# Patient Record
Sex: FEMALE | Race: WHITE | NOT HISPANIC OR LATINO | URBAN - METROPOLITAN AREA
[De-identification: names, ages, dates, MRNs, and addresses within clinical notes are randomized per-mention and may not be internally consistent; named-entity substitution may affect disease eponyms.]

---

## 2019-01-02 ENCOUNTER — OUTPATIENT (OUTPATIENT)
Dept: OUTPATIENT SERVICES | Facility: HOSPITAL | Age: 79
LOS: 1 days | End: 2019-01-02
Payer: MEDICARE

## 2019-01-02 VITALS
SYSTOLIC BLOOD PRESSURE: 147 MMHG | RESPIRATION RATE: 18 BRPM | OXYGEN SATURATION: 97 % | HEART RATE: 78 BPM | HEIGHT: 59 IN | DIASTOLIC BLOOD PRESSURE: 66 MMHG | TEMPERATURE: 98 F | WEIGHT: 169.98 LBS

## 2019-01-02 DIAGNOSIS — H25.9 UNSPECIFIED AGE-RELATED CATARACT: Chronic | ICD-10-CM

## 2019-01-02 DIAGNOSIS — Z90.710 ACQUIRED ABSENCE OF BOTH CERVIX AND UTERUS: Chronic | ICD-10-CM

## 2019-01-02 DIAGNOSIS — R94.39 ABNORMAL RESULT OF OTHER CARDIOVASCULAR FUNCTION STUDY: ICD-10-CM

## 2019-01-02 LAB
ANION GAP SERPL CALC-SCNC: 10 MMOL/L — SIGNIFICANT CHANGE UP (ref 5–17)
BUN SERPL-MCNC: 14 MG/DL — SIGNIFICANT CHANGE UP (ref 7–23)
CALCIUM SERPL-MCNC: 9.8 MG/DL — SIGNIFICANT CHANGE UP (ref 8.4–10.5)
CHLORIDE SERPL-SCNC: 102 MMOL/L — SIGNIFICANT CHANGE UP (ref 96–108)
CO2 SERPL-SCNC: 28 MMOL/L — SIGNIFICANT CHANGE UP (ref 22–31)
CREAT SERPL-MCNC: 0.84 MG/DL — SIGNIFICANT CHANGE UP (ref 0.5–1.3)
GLUCOSE SERPL-MCNC: 112 MG/DL — HIGH (ref 70–99)
HCT VFR BLD CALC: 41 % — SIGNIFICANT CHANGE UP (ref 34.5–45)
HGB BLD-MCNC: 13.7 G/DL — SIGNIFICANT CHANGE UP (ref 11.5–15.5)
MCHC RBC-ENTMCNC: 29.5 PG — SIGNIFICANT CHANGE UP (ref 27–34)
MCHC RBC-ENTMCNC: 33.4 GM/DL — SIGNIFICANT CHANGE UP (ref 32–36)
MCV RBC AUTO: 88.3 FL — SIGNIFICANT CHANGE UP (ref 80–100)
PLATELET # BLD AUTO: 219 K/UL — SIGNIFICANT CHANGE UP (ref 150–400)
POTASSIUM SERPL-MCNC: 4.3 MMOL/L — SIGNIFICANT CHANGE UP (ref 3.5–5.3)
POTASSIUM SERPL-SCNC: 4.3 MMOL/L — SIGNIFICANT CHANGE UP (ref 3.5–5.3)
RBC # BLD: 4.64 M/UL — SIGNIFICANT CHANGE UP (ref 3.8–5.2)
RBC # FLD: 13.1 % — SIGNIFICANT CHANGE UP (ref 10.3–14.5)
SODIUM SERPL-SCNC: 140 MMOL/L — SIGNIFICANT CHANGE UP (ref 135–145)
WBC # BLD: 6.2 K/UL — SIGNIFICANT CHANGE UP (ref 3.8–10.5)
WBC # FLD AUTO: 6.2 K/UL — SIGNIFICANT CHANGE UP (ref 3.8–10.5)

## 2019-01-02 PROCEDURE — C1894: CPT

## 2019-01-02 PROCEDURE — 99152 MOD SED SAME PHYS/QHP 5/>YRS: CPT

## 2019-01-02 PROCEDURE — 99203 OFFICE O/P NEW LOW 30 MIN: CPT

## 2019-01-02 PROCEDURE — C1769: CPT

## 2019-01-02 PROCEDURE — 93458 L HRT ARTERY/VENTRICLE ANGIO: CPT | Mod: 26,GC

## 2019-01-02 PROCEDURE — 76937 US GUIDE VASCULAR ACCESS: CPT | Mod: 26,GC

## 2019-01-02 PROCEDURE — 80048 BASIC METABOLIC PNL TOTAL CA: CPT

## 2019-01-02 PROCEDURE — 93010 ELECTROCARDIOGRAM REPORT: CPT

## 2019-01-02 PROCEDURE — 93458 L HRT ARTERY/VENTRICLE ANGIO: CPT

## 2019-01-02 PROCEDURE — C1760: CPT

## 2019-01-02 PROCEDURE — 93005 ELECTROCARDIOGRAM TRACING: CPT

## 2019-01-02 PROCEDURE — 99152 MOD SED SAME PHYS/QHP 5/>YRS: CPT | Mod: GC

## 2019-01-02 PROCEDURE — 76937 US GUIDE VASCULAR ACCESS: CPT

## 2019-01-02 PROCEDURE — 85027 COMPLETE CBC AUTOMATED: CPT

## 2019-01-02 PROCEDURE — C1887: CPT

## 2019-01-02 NOTE — H&P CARDIOLOGY - HISTORY OF PRESENT ILLNESS
This is a 79y/o female with PMHX of HTN, and Emphysema hx smoking . PT recently complained of sob , dyspnea and fatigue. She has mild dyspnea with exertion . PT denies any CP no SOB no palpitations or lightheadedness /dizziness.  Pt went to Cardiologist Dr. Van and had recent Stress test on 11/28/18 abnormal, recent Echo on 11/28/18 , Carotid doppler on 11/28/18 and Holter Monitor 11/28/18 , now  referred for cardiac cath . This is a 79y/o female with PMHX of HTN, and Emphysema hx smoking . PT recently complained of sob , dyspnea and fatigue. She has mild dyspnea with exertion . PT denies any CP no SOB no palpitations or lightheadedness /dizziness.  Pt went to Cardiologist Dr. Van and had recent Stress test on 11/28/18 abnormal, (as per patient no results noted ) , recent Echo on 11/28/18 , Carotid doppler on 11/28/18 and Holter Monitor 11/28/18 , now  referred for cardiac cath with Dr. Mijares . This is a 77y/o  female with PMHX of HTN, and Emphysema hx smoking . PT recently complained of sob , dyspnea and fatigue. She has mild dyspnea with exertion . PT denies any CP no SOB no palpitations or lightheadedness /dizziness.  Pt went to Cardiologist Dr. Van and had recent Stress test on 11/29/18 abnormal, findings concerning for inferior wall myocardial ischemia EF 58%  , recent Echo on 11/28/18 showed mild LVH, normal LV, mild MR, Trace TR EF 65% , Carotid doppler on 11/28/18 showed mild intimal thickening bilaterally . Pt  now  referred for cardiac cath with Dr. Mijares . Currently CP free no sob no palpitations noted.

## 2023-08-30 NOTE — H&P CARDIOLOGY - PSYCHIATRIC
[employer] Pregnancy And Lactation Text: This medication is Pregnancy Category X and contraindicated in pregnancy and in women who may become pregnant. It is unknown if this medication is excreted in breast milk. Affect and characteristics of appearance, verbalizations, behaviors are appropriate

## 2024-09-10 PROBLEM — Z00.00 ENCOUNTER FOR PREVENTIVE HEALTH EXAMINATION: Status: ACTIVE | Noted: 2024-09-10

## 2024-09-24 PROBLEM — Z78.9 DENIES ALCOHOL CONSUMPTION: Status: ACTIVE | Noted: 2024-09-24

## 2024-09-24 PROBLEM — K66.8 MASS OF OMENTUM: Status: ACTIVE | Noted: 2024-09-24

## 2024-09-24 PROBLEM — E78.5 HLD (HYPERLIPIDEMIA): Status: ACTIVE | Noted: 2024-09-24

## 2024-09-24 PROBLEM — C18.9 COLON CANCER: Status: ACTIVE | Noted: 2024-09-24

## 2024-09-24 PROBLEM — J43.9 EMPHYSEMA OF LUNG: Status: ACTIVE | Noted: 2024-09-24

## 2024-10-01 ENCOUNTER — APPOINTMENT (OUTPATIENT)
Dept: INTERVENTIONAL RADIOLOGY/VASCULAR | Facility: CLINIC | Age: 84
End: 2024-10-01
Payer: MEDICARE

## 2024-10-01 VITALS — WEIGHT: 132 LBS | HEIGHT: 60 IN | BODY MASS INDEX: 25.91 KG/M2

## 2024-10-01 DIAGNOSIS — K76.9 LIVER DISEASE, UNSPECIFIED: ICD-10-CM

## 2024-10-01 DIAGNOSIS — Z87.891 PERSONAL HISTORY OF NICOTINE DEPENDENCE: ICD-10-CM

## 2024-10-01 DIAGNOSIS — J43.9 EMPHYSEMA, UNSPECIFIED: ICD-10-CM

## 2024-10-01 DIAGNOSIS — Z78.9 OTHER SPECIFIED HEALTH STATUS: ICD-10-CM

## 2024-10-01 DIAGNOSIS — E78.5 HYPERLIPIDEMIA, UNSPECIFIED: ICD-10-CM

## 2024-10-01 DIAGNOSIS — K66.8 OTHER SPECIFIED DISORDERS OF PERITONEUM: ICD-10-CM

## 2024-10-01 DIAGNOSIS — C18.9 MALIGNANT NEOPLASM OF COLON, UNSPECIFIED: ICD-10-CM

## 2024-10-01 PROCEDURE — 99203 OFFICE O/P NEW LOW 30 MIN: CPT

## 2024-10-01 RX ORDER — FERROUS SULFATE 325(65) MG
TABLET ORAL
Refills: 0 | Status: ACTIVE | COMMUNITY

## 2024-10-01 RX ORDER — SERTRALINE HYDROCHLORIDE 25 MG/1
TABLET, FILM COATED ORAL
Refills: 0 | Status: ACTIVE | COMMUNITY

## 2024-10-01 RX ORDER — OXYCODONE 10 MG/1
10 TABLET ORAL
Refills: 0 | Status: ACTIVE | COMMUNITY

## 2024-10-01 NOTE — HISTORY OF PRESENT ILLNESS
[FreeTextEntry1] : Patient is a 84 year old female with a past medical history of newly assumed colon cancer, liver lesion and omentum lesion. She has been referred to IR by Dr. Garcias for consultation regarding a liver lesion biopsy.   Patient endorses having sudden weight loss and anemia which is what prompted evaluation.   Patient denies recent fever, chills, shortness of breath, chest pain, nausea, vomiting or diarrhea   **patient aware to have bloodwork done prior to procedure

## 2024-10-01 NOTE — ASSESSMENT
[FreeTextEntry1] : 84 year old female with a past medical history of newly assumed colon cancer, liver lesion and omentum lesion. She has been referred to IR by Dr. Garcias for consultation regarding a liver lesion biopsy.  The CT was reviewed with the patient and her family.  The omental nodule is identified however may not be easily accessible and may not have high yield as the liver biopsy.  While associated with higher risk of bleeding, liver mass biopsy should have higher diagnostic yield.  The full procedure of image guided liver mass biopsy was discussed with the patient and her family.  This included a discussion of the risks, benefits, and alternatives.  Risk discussed included, but were not limited to, risk of bleeding, infection, adjacent organ injury, and inadequate specimen.  The potential need for admission, transfusion, and additional procedures including angiogram and embolization were discussed.  Ample time was provided to answer their questions.  Consent was obtained at the time of consultation.  Plan: Liver mass biopsy with sedation. Procedure can be performed with ultrasound or CT guidance.

## 2024-10-01 NOTE — REVIEW OF SYSTEMS
[Constipation] : constipation [Fever] : no fever [Chills] : no chills [Nosebleeds] : no nosebleeds [Sore Throat] : no sore throat [Chest Pain] : no chest pain [Palpitations] : no palpitations [Shortness Of Breath] : no shortness of breath [Wheezing] : no wheezing [Cough] : no cough [Abdominal Pain] : no abdominal pain [Vomiting] : no vomiting [Easy Bleeding] : no tendency for easy bleeding [Easy Bruising] : no tendency for easy bruising

## 2024-10-01 NOTE — PHYSICAL EXAM
[Alert] : alert [Oriented x3] : oriented to person, place, and time [Restricted in physically strenuous activity but ambulatory and able to carry out work of a light or sedentary nature] : Restricted in physically strenuous activity but ambulatory and able to carry out work of a light or sedentary nature, e.g., light house work, office work [Well Nourished] : well nourished [No Respiratory Distress] : no respiratory distress [No Accessory Muscle Use] : no accessory muscle use [Normal Affect] : the affect was normal

## 2024-10-01 NOTE — REVIEW OF SYSTEMS
[Constipation] : constipation [Fever] : no fever [Chills] : no chills [Nosebleeds] : no nosebleeds [Sore Throat] : no sore throat [Chest Pain] : no chest pain [Palpitations] : no palpitations [Shortness Of Breath] : no shortness of breath [Wheezing] : no wheezing [Cough] : no cough [Abdominal Pain] : no abdominal pain [Easy Bleeding] : no tendency for easy bleeding [Vomiting] : no vomiting [Easy Bruising] : no tendency for easy bruising

## 2024-10-01 NOTE — PHYSICAL EXAM
Patient understands condition, prognosis and need for follow up care. [Alert] : alert [Oriented x3] : oriented to person, place, and time [Restricted in physically strenuous activity but ambulatory and able to carry out work of a light or sedentary nature] : Restricted in physically strenuous activity but ambulatory and able to carry out work of a light or sedentary nature, e.g., light house work, office work [Well Nourished] : well nourished [No Respiratory Distress] : no respiratory distress [No Accessory Muscle Use] : no accessory muscle use [Normal Affect] : the affect was normal

## 2024-10-01 NOTE — DATA REVIEWED
[FreeTextEntry1] : Ct abdomen reviewed with the patient. All questions answered during the consult.